# Patient Record
Sex: FEMALE | Race: BLACK OR AFRICAN AMERICAN | Employment: FULL TIME | URBAN - NONMETROPOLITAN AREA
[De-identification: names, ages, dates, MRNs, and addresses within clinical notes are randomized per-mention and may not be internally consistent; named-entity substitution may affect disease eponyms.]

---

## 2021-08-23 ENCOUNTER — HOSPITAL ENCOUNTER (OUTPATIENT)
Dept: PHYSICAL THERAPY | Age: 56
Discharge: HOME | End: 2021-08-23
Payer: COMMERCIAL

## 2021-08-23 PROCEDURE — 97161 PT EVAL LOW COMPLEX 20 MIN: CPT

## 2021-08-23 NOTE — PROGRESS NOTES
PT INITIAL EVALUATION NOTE 2-15    Patient Name: Kristy Beal  Date:2021  : 1965  [x]  Patient  Verified  Payor: BLUE CROSS / Plan: [address] [place] [address] / Product Type: PPO /    In time: 8:20 AM  Out time:9:00 AM  Visit #: 1     Treatment Area: Cervicalgia [M54.2]    SUBJECTIVE    Pain Level (0-10 scale): 8  Any medication changes, allergies to medications, adverse drug reactions, diagnosis change, or new procedure performed?: [] No    [x] Yes (see summary sheet for update)    Subjective:  63year old black female who was referred to Physical Therapy for neck pain that progressively got worse since 2021. Chief complaint: throbbing pain of right shoulder, constant numbness/tingling sensation of the right UE and difficulty sleeping at night. Night pain is relieved by hanging arm over the bed. Patient stated that she works at a [employer] in [place], [place]  for the last 3 years. X-rays revealed arthritis of the neck     PLOF: Independent  Mechanism of Injury: none  Previous Treatment/Compliance: none  PMHx/Surgical Hx: none  Work Hx: 3 years at [employer] [address] [place] [address] [employer]    Living Situation:  with family  Pt Goals:  To be able to drive without numbness of the right UE    Barriers: none  Motivation: good  Substance use: none   Cognition: A & O x 4        OBJECTIVE/EXAMINATION    Posture: fair  Other Observations: Right scapula tightness with decreased scapula mobility  Gait and Functional Mobility: none  Palpation: tenderness and swelling of the right scapula and shoulder   Right Scapula protrusion  TUG Test :11 sec - Not a fall risk     Strength: Right:  30         Left: 40 (right hand dominant)    Range of Motion limited of Upper extremity                            Right:                Left    Flexion -            130                   WNL  Abduction          160 pain           WNL  ER                      60 pain            WNL  IR 50 pain            WNL                                                       Cervical AROM    Flexion               30      Extension              45          R  L  Side Bending   40  35    Rotation   40  45      UPPER QUARTER   MUSCLE STRENGTH  KEY          R     L  0 - No Contraction  C1, C2 Neck Flex 4/5  4/5  1 - Trace   C3 Side Flex  4/5  4/5  2 - Poor   C4 Sh Elev  5/5  5/5  3 - Fair    C5 Deltoid/Biceps 5/5  5/5  4 - Good   C6 Wrist Ext  5/5  5/5  5 - Normal   C7 Triceps  5/5  5/5      C8 Thumb Ext  5/5  5/5      T1 Hand Inst             5/5  5/5          Neurological: Reflexes / Sensations: intact    Special Tests: Cervical Distraction: negative  Cervical Compression: negative           Pain Level (0-10 scale) post treatment: 8    ASSESSMENT:      [x]  See Plan of [facility], PT 8/23/2021

## 2021-08-23 NOTE — PROGRESS NOTES
[address]  [place], [place]  Ph: [phone]    Fax: [phone]    Plan of Care/Statement of Necessity for Physical Therapy Services  2-15      Patient name: Rafi Vicente  : 1965  Provider#: [id number]  Referral source: Tulio Rubalcava MD      Medical/Treatment Diagnosis: Cervicalgia [M54.2]     Prior Hospitalization: see medical history     Comorbidities: none  Prior Level of Function:Indepedent  Medications: Verified on Patient Summary List    Start of Care:2021      Onset Date: 2021       The Plan of Care and following information is based on the information from the initial evaluation. Assessment/ key information: 63year old black female who was referred to Physical Therapy for neck pain with right shoulder involvement. Patient presents symptoms of right shoulder impingement with with limited range of motion, decrease strength  and decreased cervical range of motion due to pain. Patient presents difficulty sleeping and numbness/tingling sensation of the right upper extremity. Inflammation of the right scapula with tenderness and trigger points. Patient will benefit from Physical Therapy to decrease pain, decrease inflammation, increase range of motion and return to  functional activities. Physical Therapy interventions will include therapeutic exercises, manual therapy, modalities and traction as needed. Patient will be instructed in a home exercise program with 1:1 supervision.           Evaluation Complexity History LOW Complexity : Zero comorbidities / personal factors that will impact the outcome / POC; Examination LOW Complexity : 1-2 Standardized tests and measures addressing body structure, function, activity limitation and / or participation in recreation  ;Presentation LOW Complexity : Stable, uncomplicated  ;Clinical Decision Making (TUG Test 11 sec)  Overall Complexity Rating: LOW     Problem List: pain affecting function, decrease ROM and decrease strength   Treatment Plan may include any combination of the following: Therapeutic exercise, Physical modalities, manual therapy, therapeutic activities, traction, Patient education  Patient / Family readiness to learn indicated by: asking questions  Persons(s) to be included in education: patient (P)  Barriers to Learning/Limitations: None  Patient Goal (s): To be able to sleep at night without shoulder pain  Patient Self Reported Health Status: good  Rehabilitation Potential: fair    Short Term Goals: To be accomplished in 6 treatments:  Patient will be able to drive vehicle for at least an 30 minutes with decrease numbness of the right UE at least 3 times a week. Patient will be able to sleep in the be for at least 4 hours a night with a pain level of 4/10 at least 3 times a week. Long Term Goals: To be accomplished in 12 treatments:  Patient will be able to perform an Apley Scratch Test with increased ER and IR of 70 degrees. Patient will be able to brush her hair into a pony tail with full flexion of the right UE of 180 degrees. Patient will be able to drive vehicle for at least an 45 minutes with decrease numbness of the right UE at least 3 times a week. Patient will be able to sleep in the be for at least 4 hours a night with a pain level of 4/10 at least 3 times a week. Frequency / Duration: Patient to be seen 2  times per week for 12  treatments. Patient/ Caregiver education and instruction: exercises    [x]  Plan of care has been reviewed with JASON Wagoner, PT 8/23/2021     ________________________________________________________________________    I certify that the above Therapy Services are being furnished while the patient is under my care. I agree with the treatment plan and certify that this therapy is necessary.     Physician's Signature:____________________  Date:____________Time: _________      Jodie Perez MD

## 2021-08-25 ENCOUNTER — HOSPITAL ENCOUNTER (OUTPATIENT)
Dept: PHYSICAL THERAPY | Age: 56
Discharge: HOME | End: 2021-08-25
Payer: COMMERCIAL

## 2021-08-25 PROCEDURE — 97110 THERAPEUTIC EXERCISES: CPT

## 2021-08-25 PROCEDURE — 97014 ELECTRIC STIMULATION THERAPY: CPT

## 2021-08-25 NOTE — PROGRESS NOTES
PT DAILY TREATMENT NOTE 2-15    Patient Name: Yvonnie Hamman  Date:2021  : 1965  [x]  Patient  Verified  Payor: BLUE CROSS / Plan: [address] [place] [address] / Product Type: PPO /    In time:803  Out time:0900  Total Treatment Time (min): 62  Visit #:  2    Treatment Area: Cervicalgia [M54.2]    SUBJECTIVE  Pain Level (0-10 scale): 5/10  Any medication changes, allergies to medications, adverse drug reactions, diagnosis change, or new procedure performed?: [x] No    [] Yes (see summary sheet for update)  Subjective functional status/changes:   [] No changes reported  Pt notes that her pain is mostly her arm and shoulder and is super tender to the touch. Pt notes that she has been out of work since last Friday due to her shoulder pain and goes back on Monday.     OBJECTIVE      Modality rationale: decrease inflammation, decrease pain and increase tissue extensibility to improve the patients ability to sleep throughout the night without an exacerbation of symptoms   Min Type Additional Details     15   [x] Estim: []Att   []Unatt    []TENS instruct                  []IFC  []Premod   []NMES                    []Other:  []w/US      [x]w/ heat  []w/ ice  Position: seated  Location: cervical/upper trap       []  Traction: [] Cervical       []Lumbar                       [] Prone          []Supine                       []Intermittent   []Continuous Lbs:  [] before manual  [] after manual  [] w/ heat  [] Simultaneously performed with w/ Estim    []  Ultrasound: []Continuous   [] Pulsed                       at: []1MHz   []3MHz Location:  W/cm2:    [] Paraffin         Location:   []w/heat    []  Ice     []  Heat  []  Ice massage Position:  Location:    []  Laser  []  Other: Position:  Location:      []  Vasopneumatic Device Pressure:       [] lo [] med [] hi   [] w/ ice      Temperature:   [] Simultaneously performed with w/ Estim     [x] Skin assessment post-treatment:  [x]intact [x]redness- no adverse reaction    []redness - adverse reaction:       42 min Therapeutic Exercise:  [x] See flow sheet :   Rationale: increase ROM and increase strength to improve the patients ability to perform workplace responsibilities with ease. With   [x] TE   [] TA   [] neuro   [] other: Patient Education: [x] Review HEP    [] Progressed/Changed HEP based on:   [] positioning   [] body mechanics   [] transfers   [] heat/ice application    [] other:        Pain Level (0-10 scale) post treatment: 5/10    ASSESSMENT/Changes in Function:   Pt presents to PT for first time follow up from initial evaluation and demonstrates increased R upper trap and levator scapulae tightness and trigger points. Pt completed therapeutic exercise as indicated on the flow sheet with focus of skilled PT session on improving cervical and GH ROM and postural strengthening with added emphasis on decreasing muscular tension of cervical musculature. Pt required frequent verbal cuing for decreasing compensatory strategy of upper trap elevation with performance of exercises. Patient tolerated treatment fairly well as noted by no increase in symptoms upon completion of the session. Provided pt with HEP and pt verbalized understanding of the importance of performing exercises at home in order to allow for adequate carryover between PT sessions. Patient will continue to benefit from skilled PT services to modify and progress therapeutic interventions, address functional mobility deficits, address ROM deficits, address strength deficits, analyze and address soft tissue restrictions, analyze and cue movement patterns and assess and modify postural abnormalities to attain remaining goals. [x]  See Plan of Care  []  See progress note/recertification  []  See Discharge Summary         Progress towards goals / Updated goals:  Short Term Goals:  To be accomplished in 6 treatments:  Patient will be able to drive vehicle for at least an 30 minutes with decrease numbness of the right UE at least 3 times a week. Progressing  Patient will be able to sleep in the be for at least 4 hours a night with a pain level of 4/10 at least 3 times a week. Progressing                 Long Term Goals: To be accomplished in 12 treatments:  Patient will be able to perform an Apley Scratch Test with increased ER and IR of 70 degrees. Patient will be able to brush her hair into a pony tail with full flexion of the right UE of 180 degrees. Patient will be able to drive vehicle for at least an 45 minutes with decrease numbness of the right UE at least 3 times a week. Patient will be able to sleep in the be for at least 4 hours a night with a pain level of 4/10 at least 3 times a week.                      PLAN  [x]  Upgrade activities as tolerated     [x]  Continue plan of care  []  Update interventions per flow sheet       []  Discharge due to:_  []  Other:_      Srinivas Wilson, SPT 8/25/2021

## 2021-08-31 ENCOUNTER — HOSPITAL ENCOUNTER (OUTPATIENT)
Dept: PHYSICAL THERAPY | Age: 56
Discharge: HOME | End: 2021-08-31
Payer: COMMERCIAL

## 2021-08-31 PROCEDURE — 97110 THERAPEUTIC EXERCISES: CPT

## 2021-08-31 PROCEDURE — 97014 ELECTRIC STIMULATION THERAPY: CPT

## 2021-08-31 NOTE — PROGRESS NOTES
PT DAILY TREATMENT NOTE 2-15    Patient Name: Ervin Luna  Date:2021  : 1965  [x]  Patient  Verified  Payor: Nealpaola Arora / Plan: [address] [place] [address] / Product Type: PPO /    In time: 3:26  Out time: 4:30  Total Treatment Time (min): 64  Visit #: 3    Treatment Area: Cervicalgia [M54.2]    SUBJECTIVE  Pain Level (0-10 scale): 0/10  Any medication changes, allergies to medications, adverse drug reactions, diagnosis change, or new procedure performed?: [x] No    [] Yes (see summary sheet for update)  Subjective functional status/changes:   [x] No changes reported    OBJECTIVE  Modality rationale: decrease pain and increase tissue extensibility to improve the patients ability to perform ADLs. Min Type Additional Details      15 [] Estim: []Att   [x]Unatt    []TENS instruct                  [x]IFC  []Premod   []NMES                    []Other:  []w/US      [x]w/ heat  []w/ ice  Position: Seated  Location: Over R cervical region       []  Traction: [] Cervical       []Lumbar                       [] Prone          []Supine                       []Intermittent   []Continuous Lbs:  [] before manual  [] after manual  [] w/ heat  [] Simultaneously performed with w/ Estim    []  Ultrasound: []Continuous   [] Pulsed                       at: []1MHz   []3MHz Location:  W/cm2:    [] Paraffin         Location:   []w/heat   15 []  Ice     [x]  Heat  []  Ice massage Position: Seated  Location: Over cervical region    []  Laser  []  Other: Position:  Location:      []  Vasopneumatic Device Pressure:       [] lo [] med [] hi   [] w/ ice      Temperature:   [] Simultaneously performed with w/ Estim     [x] Skin assessment post-treatment:  [x]intact [x]redness- no adverse reaction    []redness - adverse reaction:       49 min Therapeutic Exercise:  [x] See flow sheet :   Rationale: increase ROM and increase strength to improve the patients ability to perform ADLs.           With   [x] TE   [] TA   [] neuro   [] other: Patient Education: [x] Review HEP    [] Progressed/Changed HEP based on:   [] positioning   [] body mechanics   [] transfers   [] heat/ice application    [] other:      Other Objective/Functional Measures: Increased repetitions with chin tucks and cervical rotation and extension with towel to facilitate further improvements with cervical AROM. Pain Level (0-10 scale) post treatment: 0/10    ASSESSMENT/Changes in Function:   Patient tolerated treatment well. Pt is steadily progressing with improving cervical AROM as noted by progression of exercises. Pt requires verbal cueing for proper performance of cervical rotation with towel to prevent overstretching. Continue to progress as able. Patient will continue to benefit from skilled PT services to modify and progress therapeutic interventions, address functional mobility deficits, address ROM deficits, address strength deficits, analyze and address soft tissue restrictions, analyze and cue movement patterns, analyze and modify body mechanics/ergonomics and assess and modify postural abnormalities to attain remaining goals. [x]  See Plan of Care  []  See progress note/recertification  []  See Discharge Summary         Progress towards goals / Updated goals:  Short Term Goals: To be accomplished in 6 treatments:  Patient will be able to drive vehicle for at least an 30 minutes with decrease numbness of the right UE at least 3 times a week. Progressing  Patient will be able to sleep in the be for at least 4 hours a night with a pain level of 4/10 at least 3 times a week. Progressing                 Long Term Goals: To be accomplished in 12 treatments:  Patient will be able to perform an Apley Scratch Test with increased ER and IR of 70 degrees. Patient will be able to brush her hair into a pony tail with full flexion of the right UE of 180 degrees.   Patient will be able to drive vehicle for at least an 45 minutes with decrease numbness of the right UE at least 3 times a week. Patient will be able to sleep in the be for at least 4 hours a night with a pain level of 4/10 at least 3 times a week.     PLAN  [x]  Upgrade activities as tolerated     [x]  Continue plan of care  []  Update interventions per flow sheet       []  Discharge due to:_  []  Other:_      Richi Pendleton, PT, DPT 8/31/2021

## 2021-09-02 ENCOUNTER — HOSPITAL ENCOUNTER (OUTPATIENT)
Dept: PHYSICAL THERAPY | Age: 56
Discharge: HOME | End: 2021-09-02
Payer: COMMERCIAL

## 2021-09-02 PROCEDURE — 97016 VASOPNEUMATIC DEVICE THERAPY: CPT

## 2021-09-02 PROCEDURE — 97014 ELECTRIC STIMULATION THERAPY: CPT

## 2021-09-02 PROCEDURE — 97110 THERAPEUTIC EXERCISES: CPT

## 2021-09-02 NOTE — PROGRESS NOTES
PT DAILY TREATMENT NOTE 2-15    Patient Name: Neha Luke  Date:2021  : 1965  [x]  Patient  Verified  Payor: BLUE CROSS / Plan: [address] [place] [address] / Product Type: PPO /    In time:3:30 PM  Out time:4:25 PM  Total Treatment Time (min): 55  Visit #: 4    Treatment Area: Cervicalgia [M54.2]    SUBJECTIVE  Pain Level (0-10 scale): 0  Any medication changes, allergies to medications, adverse drug reactions, diagnosis change, or new procedure performed?: [x] No    [] Yes (see summary sheet for update)  Subjective functional status/changes:   [x] No changes reported      OBJECTIVE    Modality rationale: decrease inflammation, decrease pain and increase tissue extensibility to improve the patients ability to   drive vehicle for at least an 30 minutes with decrease numbness of the right UE at least 3 times a week   Min Type Additional Details      15 [x] Estim: []Att   [x]Unatt    []TENS instruct                  [x]IFC  []Premod   []NMES                     []Other:  []w/US   []w/ice   [x]w/heat  Position:right side of neck and shoulder in sitting       []  Traction: [] Cervical       []Lumbar                       [] Prone          []Supine                       []Intermittent   []Continuous Lbs:  [] before manual  [] after manual  []w/heat    []  Ultrasound: []Continuous   [] Pulsed                       at: []1MHz   []3MHz Location:  W/cm2:    [] Paraffin         Location:   []w/heat    []  Ice     []  Heat  []  Ice massage Position:  Location:    []  Laser  []  Other: Position:  Location:      []  Vasopneumatic Device Pressure:       [] lo [] med [] hi   Temperature:      [x] Skin assessment post-treatment:  [x]intact []redness- no adverse reaction    []redness - adverse reaction:         40 min Therapeutic Exercise:  [x] See flow sheet :   Rationale: increase ROM and increase strength to improve the patients ability to able to drive vehicle for at least an   30 minutes with decrease numbness of the right UE at least 3 times a week. With   [] TE   [] TA   [] Neuro   [] SC   [] other: Patient Education: [x] Review HEP    [] Progressed/Changed HEP based on:   [] positioning   [] body mechanics   [] transfers   [] heat/ice application    [] other:           Pain Level (0-10 scale) post treatment: 0    ASSESSMENT/Changes in Function:   Patient has inflammation of the  right shoulder. Pain with rotation movement. Best results from the use of modalities at this time. Continue with  Stretching exercises. May try ICE due to inflammation and trigger points. Patient will continue to benefit from skilled PT services to address ROM deficits and address strength deficits to attain remaining goals. [x]  See Plan of Care  []  See progress note/recertification  []  See Discharge Summary         Progress towards goals / Updated goals:  Short Term Goals: To be accomplished in 6 treatments:  Patient will be able to drive vehicle for at least an 30 minutes with decrease numbness of the right UE at least 3 times a week. Progressing  Patient will be able to sleep in the be for at least 4 hours a night with a pain level of 4/10 at least 3 times a week. Progressing                 Long Term Goals: To be accomplished in 12 treatments:  Patient will be able to perform an Apley Scratch Test with increased ER and IR of 70 degrees. Patient will be able to brush her hair into a pony tail with full flexion of the right UE of 180 degrees. Patient will be able to drive vehicle for at least an 45 minutes with decrease numbness of the right UE at least 3 times a week. Patient will be able to sleep in the be for at least 4 hours a night with a pain level of 4/10 at least 3 times a week. Progress towards goals / Updated goals:      PLAN  []  Upgrade activities as tolerated     [x]  Continue plan of care  []  Update interventions per flow sheet       []  Discharge due to:_  []  Other:_      Nay Ricardo PT 9/2/2021

## 2021-09-07 ENCOUNTER — HOSPITAL ENCOUNTER (OUTPATIENT)
Dept: PHYSICAL THERAPY | Age: 56
Discharge: HOME | End: 2021-09-07
Payer: COMMERCIAL

## 2021-09-07 PROCEDURE — 97110 THERAPEUTIC EXERCISES: CPT

## 2021-09-07 PROCEDURE — 97014 ELECTRIC STIMULATION THERAPY: CPT

## 2021-09-07 NOTE — PROGRESS NOTES
PT DAILY TREATMENT NOTE 2-15    Patient Name: Vern Lopez  Date:2021  : 1965  [x]  Patient  Verified  Payor: BLUE CROSS / Plan: [address] [place] [address] / Product Type: PPO /    In time:3:50 PM  Out time: 4:20  Total Treatment Time (min): 40  Visit #:  5    Treatment Area: Cervicalgia [M54.2]    SUBJECTIVE  Pain Level (0-10 scale): 3  Any medication changes, allergies to medications, adverse drug reactions, diagnosis change, or new procedure performed?: [x] No    [] Yes (see summary sheet for update)  Subjective functional status/changes:   [] No changes reported    Requested to leave early    OBJECTIVE    Modality rationale: decrease edema and decrease pain to improve the patients ability to  sleep at least 4 hours a night with a pain level of 4/10 at least 3 times a week.    Min Type Additional Details      15 [x] Estim: []Att   [x]Unatt    []TENS instruct                  [x]IFC  []Premod   []NMES                     []Other:  []w/US   []w/ice   [x]w/heat  Position:left shoulder   Location:sitting position       []  Traction: [] Cervical       []Lumbar                       [] Prone          []Supine                       []Intermittent   []Continuous Lbs:  [] before manual  [] after manual  []w/heat    []  Ultrasound: []Continuous   [] Pulsed                       at: []1MHz   []3MHz Location:  W/cm2:    [] Paraffin         Location:   []w/heat    []  Ice     []  Heat  []  Ice massage Position:  Location:    []  Laser  []  Other: Position:  Location:      []  Vasopneumatic Device Pressure:       [] lo [] med [] hi   Temperature:      [x] Skin assessment post-treatment:  [x]intact []redness- no adverse reaction    []redness - adverse reaction:         25 min Therapeutic Exercise:  [x] See flow sheet :   Rationale: increase ROM and increase strength to improve the patients ability to sleep in the be for at least 4 hours a night with   a pain level of 4/10 at least 3 times a week.       With   [] TE   [] TA   [] Neuro   [] SC   [] other: Patient Education: [x] Review HEP    [] Progressed/Changed HEP based on:   [] positioning   [] body mechanics   [] transfers   [] heat/ice application    [] other:        Pain Level (0-10 scale) post treatment: 2    ASSESSMENT/Changes in Function:   Patient is experiencing pain of the right side of neck and shoulder. Muscle inflammation with trigger points of the scapula. Patient will continue to benefit from skilled PT services to address ROM deficits, address strength deficits and analyze and address soft tissue restrictions to attain remaining goals. [x]  See Plan of Care  []  See progress note/recertification  []  See Discharge Summary           Progress towards goals / Updated goals:  Short Term Goals: To be accomplished in 6 treatments:  Patient will be able to drive vehicle for at least an 30 minutes with decrease numbness of the right UE at least 3 times a week. Progressing  Patient will be able to sleep in the be for at least 4 hours a night with a pain level of 4/10 at least 3 times a week. Progressing                 Long Term Goals: To be accomplished in 12 treatments:  Patient will be able to perform an Apley Scratch Test with increased ER and IR of 70 degrees. Patient will be able to brush her hair into a pony tail with full flexion of the right UE of 180 degrees. Patient will be able to drive vehicle for at least an 45 minutes with decrease numbness of the right UE at least 3 times a week. Patient will be able to sleep in the be for at least 4 hours a night with a pain level of 4/10 at least 3 times a week. rogress towards goals / Updated goals:       PLAN  []  Upgrade activities as tolerated     [x]  Continue plan of care  []  Update interventions per flow sheet       []  Discharge due to:_  []  Other:_      Karla Wagoner, PT 9/7/2021

## 2021-09-09 ENCOUNTER — HOSPITAL ENCOUNTER (OUTPATIENT)
Dept: PHYSICAL THERAPY | Age: 56
Discharge: HOME | End: 2021-09-09
Payer: COMMERCIAL

## 2021-09-09 PROCEDURE — 97014 ELECTRIC STIMULATION THERAPY: CPT

## 2021-09-09 PROCEDURE — 97110 THERAPEUTIC EXERCISES: CPT

## 2021-09-09 NOTE — PROGRESS NOTES
PT DAILY TREATMENT NOTE 2-15    Patient Name: Bertha Part  Date:2021  : 1965  [x]  Patient  Verified  Payor: BLUE CROSS / Plan: [address] [place] [address] / Product Type: PPO /    In time:   Out time: 9654  Total Treatment Time (min): 61  Visit #:  6    Treatment Area: Cervicalgia [M54.2]    SUBJECTIVE  Pain Level (0-10 scale): 3  Any medication changes, allergies to medications, adverse drug reactions, diagnosis change, or new procedure performed?: [x] No    [] Yes (see summary sheet for update)  Subjective functional status/changes:   [] No changes reported    \"It has been really hurting because of the rain. \"    OBJECTIVE    Modality rationale: decrease edema and decrease pain to improve the patients ability to  sleep at least 4 hours a night with a pain level of 4/10 at least 3 times a week.    Min Type Additional Details      15 [x] Estim: []Att   [x]Unatt    []TENS instruct                  [x]IFC  []Premod   []NMES                     []Other:  []w/US   []w/ice   [x]w/heat  Position:left shoulder   Location:sitting position       []  Traction: [] Cervical       []Lumbar                       [] Prone          []Supine                       []Intermittent   []Continuous Lbs:  [] before manual  [] after manual  []w/heat    []  Ultrasound: []Continuous   [] Pulsed                       at: []1MHz   []3MHz Location:  W/cm2:    [] Paraffin         Location:   []w/heat    []  Ice     []  Heat  []  Ice massage Position:  Location:    []  Laser  []  Other: Position:  Location:      []  Vasopneumatic Device Pressure:       [] lo [] med [] hi   Temperature:      [x] Skin assessment post-treatment:  [x]intact []redness- no adverse reaction    []redness - adverse reaction:         44 min Therapeutic Exercise:  [x] See flow sheet :   Rationale: increase ROM and increase strength to improve the patients ability to sleep in the be for at least 4 hours a night with   a pain level of 4/10 at least 3 times a week.       With   [x] TE   [] TA   [] Neuro   [] SC   [] other: Patient Education: [x] Review HEP    [] Progressed/Changed HEP based on:   [] positioning   [] body mechanics   [] transfers   [] heat/ice application    [] other:        Pain Level (0-10 scale) post treatment: 0/10    ASSESSMENT/Changes in Function:   Pt tolerated treatment well today. Able to tolerate exercises and needed minimal cues for correct posture. Pain continues to be along R UT due to myofacial tightness. Patient will continue to benefit from skilled PT services to address ROM deficits, address strength deficits and analyze and address soft tissue restrictions to attain remaining goals. [x]  See Plan of Care  []  See progress note/recertification  []  See Discharge Summary           Progress towards goals / Updated goals:  Short Term Goals: To be accomplished in 6 treatments:  Patient will be able to drive vehicle for at least an 30 minutes with decrease numbness of the right UE at least 3 times a week. Progressing  Patient will be able to sleep in the be for at least 4 hours a night with a pain level of 4/10 at least 3 times a week. Progressing                 Long Term Goals: To be accomplished in 12 treatments:  Patient will be able to perform an Apley Scratch Test with increased ER and IR of 70 degrees. Patient will be able to brush her hair into a pony tail with full flexion of the right UE of 180 degrees. Patient will be able to drive vehicle for at least an 45 minutes with decrease numbness of the right UE at least 3 times a week. Patient will be able to sleep in the be for at least 4 hours a night with a pain level of 4/10 at least 3 times a week. rogress towards goals / Updated goals:       PLAN  [x]  Upgrade activities as tolerated     [x]  Continue plan of care  []  Update interventions per flow sheet       []  Discharge due to:_  []  Other:_      Johnny Carranza, PT, DPT 9/9/2021

## 2022-01-04 NOTE — PROGRESS NOTES
[address]  [place], [place]  Ph: [phone]    Fax: [phone]    Discharge Summary  2-15    Patient name: Izzy Hernandez  : 1965  Provider#: [id number]  Referral source: Janene Baird MD      Medical/Treatment Diagnosis: Cervicalgia [M54.2]     Prior Hospitalization: see medical history     Comorbidities: See Plan of Care  Prior Level of Function:See Plan of Care  Medications: Verified on Patient Summary List    Start of Care: 2021      Onset Date: 2021   Visits from Start of Care: 6 Missed Visits: 6  Reporting Period : 21 to 21      ASSESSMENT/SUMMARY OF CARE:     63year old black female who was referred to Physical Therapy for neck pain with right shoulder involvement. Patient presented symptoms of right shoulder impingement with with limited range of motion, decrease strength  and decreased cervical range of motion due to pain. Patient has returned to her prior level of function. Patient was at a pain level of 0/10. Patient has benefit from Physical Therapy to decrease pain, decrease inflammation, increase range of motion and return to  functional activities. Physical Therapy interventions will include therapeutic exercises, manual therapy, modalities and traction as needed. Patient was instructed in a home exercise program with 1:1 supervision. Progress towards goals / Updated goals:  Short Term Goals: To be accomplished in 6 treatments:  Patient will be able to drive vehicle for at least an 30 minutes with decrease numbness of the right UE at least 3 times a week. Progressing  Patient will be able to sleep in the be for at least 4 hours a night with a pain level of 4/10 at least 3 times a week. Progressing                 Long Term Goals: To be accomplished in 12 treatments:  Patient will be able to perform an Apley Scratch Test with increased ER and IR of 70 degrees.  Not Met  Patient will be able to brush her hair into a pony tail with full flexion of the right UE of 180 degrees. Not Met  Patient will be able to drive vehicle for at least an 45 minutes with decrease numbness of the right UE at least 3 times a week. Not Met  Patient will be able to sleep in the be for at least 4 hours a night with a pain level of 4/10 at least 3 times a week.  Not Met      RECOMMENDATIONS:  [x]Discontinue therapy: []Patient has reached or is progressing toward set goals      []Patient is non-compliant or has abdicated      []Due to lack of appreciable progress towards set goals      []Other      Anne Arce, PT  1/4/2022

## 2022-06-23 VITALS — WEIGHT: 148 LBS | BODY MASS INDEX: 29.06 KG/M2 | HEIGHT: 60 IN

## 2022-06-23 PROBLEM — K62.5 HEMORRHAGE OF RECTUM AND ANUS: Status: ACTIVE | Noted: 2022-06-17

## 2022-06-23 PROBLEM — R19.7 DIARRHEA: Status: ACTIVE | Noted: 2022-06-23

## 2022-06-23 PROBLEM — K62.5 RECTAL BLEEDING: Status: ACTIVE | Noted: 2022-06-23

## 2022-06-23 PROBLEM — R11.0 NAUSEA: Status: ACTIVE | Noted: 2022-06-23

## 2022-06-23 RX ORDER — MELOXICAM 7.5 MG/1
TABLET ORAL
COMMUNITY
Start: 2022-06-13 | End: 2022-10-24

## 2022-06-23 RX ORDER — HYDROCORTISONE 25 MG/G
CREAM TOPICAL
COMMUNITY
Start: 2022-06-17 | End: 2022-10-24

## 2022-06-23 RX ORDER — ONDANSETRON 4 MG/1
TABLET, ORALLY DISINTEGRATING ORAL
COMMUNITY
Start: 2022-06-13 | End: 2022-10-24

## 2022-07-01 PROBLEM — Z87.898 HX OF FEVER: Status: ACTIVE | Noted: 2022-06-13

## 2022-07-01 PROBLEM — R63.0 DECREASED APPETITE: Status: ACTIVE | Noted: 2022-07-01

## 2022-07-05 ENCOUNTER — OFFICE VISIT (OUTPATIENT)
Dept: GASTROENTEROLOGY | Age: 57
End: 2022-07-05
Payer: COMMERCIAL

## 2022-07-05 ENCOUNTER — TELEPHONE (OUTPATIENT)
Dept: GASTROENTEROLOGY | Age: 57
End: 2022-07-05

## 2022-07-05 VITALS
WEIGHT: 144 LBS | BODY MASS INDEX: 28.27 KG/M2 | DIASTOLIC BLOOD PRESSURE: 84 MMHG | RESPIRATION RATE: 14 BRPM | OXYGEN SATURATION: 98 % | HEART RATE: 97 BPM | TEMPERATURE: 98.1 F | HEIGHT: 60 IN | SYSTOLIC BLOOD PRESSURE: 140 MMHG

## 2022-07-05 DIAGNOSIS — K62.5 RECTAL BLEEDING: Primary | ICD-10-CM

## 2022-07-05 DIAGNOSIS — K62.5 GASTROINTESTINAL HEMORRHAGE ASSOCIATED WITH ANORECTAL SOURCE: ICD-10-CM

## 2022-07-05 DIAGNOSIS — K59.00 CONSTIPATION, UNSPECIFIED CONSTIPATION TYPE: ICD-10-CM

## 2022-07-05 DIAGNOSIS — R10.30 LOWER ABDOMINAL PAIN: ICD-10-CM

## 2022-07-05 PROCEDURE — 99204 OFFICE O/P NEW MOD 45 MIN: CPT | Performed by: INTERNAL MEDICINE

## 2022-07-05 RX ORDER — POLYETHYLENE GLYCOL 3350 17 G/17G
POWDER, FOR SOLUTION ORAL
Qty: 510 G | Refills: 0 | Status: SHIPPED | OUTPATIENT
Start: 2022-07-05 | End: 2022-07-14

## 2022-07-05 RX ORDER — LACTULOSE 10 G/15ML
20 SOLUTION ORAL; RECTAL
Qty: 946 ML | Refills: 5 | Status: SHIPPED | OUTPATIENT
Start: 2022-07-05

## 2022-07-05 NOTE — PROGRESS NOTES
1. Have you been to the ER, urgent care clinic since your last visit? Hospitalized since your last visit? no    2. Have you seen or consulted any other health care providers outside of the [address] [place] [address] since your last visit? Include any pap smears or colon screening.   No   Chief Complaint   Patient presents with    New Patient    Rectal Bleeding     Visit Vitals  BP (!) 140/84 (BP 1 Location: Right upper arm, BP Patient Position: Sitting, BP Cuff Size: Adult)   Pulse 97   Temp 98.1 °F (36.7 °C) (Temporal)   Resp 14   Ht 5' (1.524 m)   Wt 65.3 kg (144 lb)   SpO2 98% Comment: room air   BMI 28.12 kg/m²

## 2022-07-05 NOTE — PROGRESS NOTES
COLONOSCOPY IS SCHEDULED FOR 7-  NO PRIOR AUTH REQUIRED PER [address] [place] [address]-7-6-2022.  REF # [id number]. 11:11 AM

## 2022-07-05 NOTE — PROGRESS NOTES
Scott King is a 61 y.o. female who presents today for the following:  Chief Complaint   Patient presents with    New Patient    Rectal Bleeding     last episode one week ago         No Known Allergies    Current Outpatient Medications   Medication Sig    polyethylene glycol (MIRALAX) 17 gram/dose powder Use as directed  Indications: emptying of the bowel    lactulose (CHRONULAC) 10 gram/15 mL solution Take 30 mL by mouth daily (after dinner). Indications: constipation    Procto-Med HC 2.5 % rectal cream     ondansetron (ZOFRAN ODT) 4 mg disintegrating tablet  (Patient not taking: Reported on 2022)    meloxicam (MOBIC) 7.5 mg tablet  (Patient not taking: Reported on 2022)     No current facility-administered medications for this visit. Past Medical History:   Diagnosis Date    Abdominal pain     lower    Anemia 2022    HGB 10.7      DR KEMP'S OFFICE.     Constipation     Decreased appetite 2022    Diarrhea 2022    H/O hemorrhoids     Hemorrhage of rectum and anus 2022    Hx of fever 2022    HX OF FEVER AND CHILLS 22    Nausea 2022    Over weight     Rectal bleeding        Past Surgical History:   Procedure Laterality Date    HX  SECTION         Family History   Problem Relation Age of Onset    Diabetes Mother     Hypertension Mother     Heart Disease Father        Social History     Socioeconomic History    Marital status:      Spouse name: Not on file    Number of children: Not on file    Years of education: Not on file    Highest education level: Not on file   Occupational History    Not on file   Tobacco Use    Smoking status: Never Smoker    Smokeless tobacco: Never Used   Vaping Use    Vaping Use: Never used   Substance and Sexual Activity    Alcohol use: Never    Drug use: Never    Sexual activity: Not on file   Other Topics Concern    Not on file   Social History Narrative    Not on file     Social Determinants of Health     Financial Resource Strain:     Difficulty of Paying Living Expenses: Not on file   Food Insecurity:     Worried About Running Out of Food in the Last Year: Not on file    Arturo of Food in the Last Year: Not on file   Transportation Needs:     Lack of Transportation (Medical): Not on file    Lack of Transportation (Non-Medical): Not on file   Physical Activity:     Days of Exercise per Week: Not on file    Minutes of Exercise per Session: Not on file   Stress:     Feeling of Stress : Not on file   Social Connections:     Frequency of Communication with Friends and Family: Not on file    Frequency of Social Gatherings with Friends and Family: Not on file    Attends [employer] Services: Not on file    Active Member of [address] [place] [address] [employer] or Organizations: Not on file    Attends Club or Organization Meetings: Not on file    Marital Status: Not on file   Intimate Partner Violence:     Fear of Current or Ex-Partner: Not on file    Emotionally Abused: Not on file    Physically Abused: Not on file    Sexually Abused: Not on file   Housing Stability:     Unable to Pay for Housing in the Last Year: Not on file    Number of [place] in the Last Year: Not on file    Unstable Housing in the Last Year: Not on file         HPI  65-year-old female with history of chronic constipation hemorrhoids who comes in for evaluation of rectal bleeding. Patient states for approximately the last couple weeks she has been seen in blood in her stool on defecation. She states initially she passed a large amount and has been less since that time. The blood was in the commode as well as on wiping. Blood was bright red blood. She states she still seems a little blood intermittently. The blood is outside of the stool. She does have a history of hemorrhoids. She did have lower abdominal pain initially. She states that her bowel movements have been constipated a lot and she has to strain.   No prior colonoscopy. She is eating well and has good appetite. Stable weight. She denies use of NSAIDs or blood thinners. Review of Systems   Constitutional: Negative. HENT: Negative. Negative for nosebleeds. Eyes: Negative. Respiratory: Negative. Cardiovascular: Negative. Gastrointestinal: Positive for abdominal pain and constipation. Negative for blood in stool, diarrhea, heartburn, melena, nausea and vomiting. Genitourinary: Negative. Skin: Negative. Neurological: Negative. Endo/Heme/Allergies: Negative. Psychiatric/Behavioral: Negative. All other systems reviewed and are negative. Visit Vitals  BP (!) 140/84 (BP 1 Location: Right upper arm, BP Patient Position: Sitting, BP Cuff Size: Adult)   Pulse 97   Temp 98.1 °F (36.7 °C) (Temporal)   Resp 14   Ht 5' (1.524 m)   Wt 65.3 kg (144 lb)   SpO2 98% Comment: room air   BMI 28.12 kg/m²     Physical Exam  Vitals and nursing note reviewed. Constitutional:       Appearance: Normal appearance. She is obese. HENT:      Head: Normocephalic and atraumatic. Nose: Nose normal.      Mouth/Throat:      Mouth: Mucous membranes are moist.      Pharynx: Oropharynx is clear. Eyes:      General: No scleral icterus. Conjunctiva/sclera: Conjunctivae normal.      Pupils: Pupils are equal, round, and reactive to light. Cardiovascular:      Rate and Rhythm: Normal rate and regular rhythm. Pulses: Normal pulses. Heart sounds: Normal heart sounds. Pulmonary:      Effort: Pulmonary effort is normal.      Breath sounds: Normal breath sounds. Abdominal:      General: Bowel sounds are normal. There is no distension. Palpations: Abdomen is soft. There is no mass. Tenderness: There is abdominal tenderness. There is no right CVA tenderness, left CVA tenderness, guarding or rebound. Hernia: No hernia is present. Musculoskeletal:         General: Normal range of motion.       Cervical back: Normal range of motion and neck supple. Skin:     General: Skin is warm and dry. Coloration: Skin is not jaundiced. Neurological:      General: No focal deficit present. Mental Status: She is alert and oriented to person, place, and time. Psychiatric:         Mood and Affect: Mood normal.         Behavior: Behavior normal.         Thought Content: Thought content normal.         Judgment: Judgment normal.            1. Rectal bleeding  Patient for a colonoscopy to further evaluate the cause of this rectal bleeding. Must consider hemorrhoids versus fissure versus diverticular bleeding versus a polyp or neoplasm.   - COLONOSCOPY,DIAGNOSTIC; Future  - polyethylene glycol (MIRALAX) 17 gram/dose powder; Use as directed  Indications: emptying of the bowel  Dispense: 510 g; Refill: 0    2. Gastrointestinal hemorrhage associated with anorectal source      3. Lower abdominal pain      4. Constipation, unspecified constipation type  We will start patient on lactulose 30 mL every evening and adjust dose as needed. Patient advised to increase fluid intake. - lactulose (CHRONULAC) 10 gram/15 mL solution; Take 30 mL by mouth daily (after dinner).  Indications: constipation  Dispense: 946 mL; Refill: 5

## 2022-07-05 NOTE — LETTER
NOTIFICATION RETURN TO WORK / SCHOOL    7/5/2022 5:21 PM    [address]. [address] [facility] [address]      To Whom It May Concern:    Zeny Griffith is currently under the care of MONSERRAT Masterson, and was seen by me today. She will return to work/school on next scheduled work day. If there are questions or concerns please have the patient contact our office.         Sincerely,      Sin Mcgee MD

## 2022-07-05 NOTE — H&P (VIEW-ONLY)
Serafin Ca is a 61 y.o. female who presents today for the following:  Chief Complaint   Patient presents with    New Patient    Rectal Bleeding     last episode one week ago         No Known Allergies    Current Outpatient Medications   Medication Sig    polyethylene glycol (MIRALAX) 17 gram/dose powder Use as directed  Indications: emptying of the bowel    lactulose (CHRONULAC) 10 gram/15 mL solution Take 30 mL by mouth daily (after dinner). Indications: constipation    Procto-Med HC 2.5 % rectal cream     ondansetron (ZOFRAN ODT) 4 mg disintegrating tablet  (Patient not taking: Reported on 2022)    meloxicam (MOBIC) 7.5 mg tablet  (Patient not taking: Reported on 2022)     No current facility-administered medications for this visit. Past Medical History:   Diagnosis Date    Abdominal pain     lower    Anemia 2022    HGB 10.7      DR KEMP'S OFFICE.     Constipation     Decreased appetite 2022    Diarrhea 2022    H/O hemorrhoids     Hemorrhage of rectum and anus 2022    Hx of fever 2022    HX OF FEVER AND CHILLS 22    Nausea 2022    Over weight     Rectal bleeding        Past Surgical History:   Procedure Laterality Date    HX  SECTION         Family History   Problem Relation Age of Onset    Diabetes Mother     Hypertension Mother     Heart Disease Father        Social History     Socioeconomic History    Marital status:      Spouse name: Not on file    Number of children: Not on file    Years of education: Not on file    Highest education level: Not on file   Occupational History    Not on file   Tobacco Use    Smoking status: Never Smoker    Smokeless tobacco: Never Used   Vaping Use    Vaping Use: Never used   Substance and Sexual Activity    Alcohol use: Never    Drug use: Never    Sexual activity: Not on file   Other Topics Concern    Not on file   Social History Narrative    Not on file     Social Determinants of Health     Financial Resource Strain:     Difficulty of Paying Living Expenses: Not on file   Food Insecurity:     Worried About Running Out of Food in the Last Year: Not on file    Arturo of Food in the Last Year: Not on file   Transportation Needs:     Lack of Transportation (Medical): Not on file    Lack of Transportation (Non-Medical): Not on file   Physical Activity:     Days of Exercise per Week: Not on file    Minutes of Exercise per Session: Not on file   Stress:     Feeling of Stress : Not on file   Social Connections:     Frequency of Communication with Friends and Family: Not on file    Frequency of Social Gatherings with Friends and Family: Not on file    Attends [employer] Services: Not on file    Active Member of [address] [place] [address] [employer] or Organizations: Not on file    Attends Club or Organization Meetings: Not on file    Marital Status: Not on file   Intimate Partner Violence:     Fear of Current or Ex-Partner: Not on file    Emotionally Abused: Not on file    Physically Abused: Not on file    Sexually Abused: Not on file   Housing Stability:     Unable to Pay for Housing in the Last Year: Not on file    Number of [place] in the Last Year: Not on file    Unstable Housing in the Last Year: Not on file         HPI  79-year-old female with history of chronic constipation hemorrhoids who comes in for evaluation of rectal bleeding. Patient states for approximately the last couple weeks she has been seen in blood in her stool on defecation. She states initially she passed a large amount and has been less since that time. The blood was in the commode as well as on wiping. Blood was bright red blood. She states she still seems a little blood intermittently. The blood is outside of the stool. She does have a history of hemorrhoids. She did have lower abdominal pain initially. She states that her bowel movements have been constipated a lot and she has to strain.   No prior colonoscopy. She is eating well and has good appetite. Stable weight. She denies use of NSAIDs or blood thinners. Review of Systems   Constitutional: Negative. HENT: Negative. Negative for nosebleeds. Eyes: Negative. Respiratory: Negative. Cardiovascular: Negative. Gastrointestinal: Positive for abdominal pain and constipation. Negative for blood in stool, diarrhea, heartburn, melena, nausea and vomiting. Genitourinary: Negative. Skin: Negative. Neurological: Negative. Endo/Heme/Allergies: Negative. Psychiatric/Behavioral: Negative. All other systems reviewed and are negative. Visit Vitals  BP (!) 140/84 (BP 1 Location: Right upper arm, BP Patient Position: Sitting, BP Cuff Size: Adult)   Pulse 97   Temp 98.1 °F (36.7 °C) (Temporal)   Resp 14   Ht 5' (1.524 m)   Wt 65.3 kg (144 lb)   SpO2 98% Comment: room air   BMI 28.12 kg/m²     Physical Exam  Vitals and nursing note reviewed. Constitutional:       Appearance: Normal appearance. She is obese. HENT:      Head: Normocephalic and atraumatic. Nose: Nose normal.      Mouth/Throat:      Mouth: Mucous membranes are moist.      Pharynx: Oropharynx is clear. Eyes:      General: No scleral icterus. Conjunctiva/sclera: Conjunctivae normal.      Pupils: Pupils are equal, round, and reactive to light. Cardiovascular:      Rate and Rhythm: Normal rate and regular rhythm. Pulses: Normal pulses. Heart sounds: Normal heart sounds. Pulmonary:      Effort: Pulmonary effort is normal.      Breath sounds: Normal breath sounds. Abdominal:      General: Bowel sounds are normal. There is no distension. Palpations: Abdomen is soft. There is no mass. Tenderness: There is abdominal tenderness. There is no right CVA tenderness, left CVA tenderness, guarding or rebound. Hernia: No hernia is present. Musculoskeletal:         General: Normal range of motion.       Cervical back: Normal range of motion and neck supple. Skin:     General: Skin is warm and dry. Coloration: Skin is not jaundiced. Neurological:      General: No focal deficit present. Mental Status: She is alert and oriented to person, place, and time. Psychiatric:         Mood and Affect: Mood normal.         Behavior: Behavior normal.         Thought Content: Thought content normal.         Judgment: Judgment normal.            1. Rectal bleeding  Patient for a colonoscopy to further evaluate the cause of this rectal bleeding. Must consider hemorrhoids versus fissure versus diverticular bleeding versus a polyp or neoplasm.   - COLONOSCOPY,DIAGNOSTIC; Future  - polyethylene glycol (MIRALAX) 17 gram/dose powder; Use as directed  Indications: emptying of the bowel  Dispense: 510 g; Refill: 0    2. Gastrointestinal hemorrhage associated with anorectal source      3. Lower abdominal pain      4. Constipation, unspecified constipation type  We will start patient on lactulose 30 mL every evening and adjust dose as needed. Patient advised to increase fluid intake. - lactulose (CHRONULAC) 10 gram/15 mL solution; Take 30 mL by mouth daily (after dinner).  Indications: constipation  Dispense: 946 mL; Refill: 5

## 2022-07-14 ENCOUNTER — ANESTHESIA (OUTPATIENT)
Dept: ENDOSCOPY | Age: 57
End: 2022-07-14
Payer: COMMERCIAL

## 2022-07-14 ENCOUNTER — HOSPITAL ENCOUNTER (OUTPATIENT)
Age: 57
Setting detail: OUTPATIENT SURGERY
Discharge: HOME | End: 2022-07-14
Attending: INTERNAL MEDICINE | Admitting: INTERNAL MEDICINE
Payer: COMMERCIAL

## 2022-07-14 ENCOUNTER — ANESTHESIA EVENT (OUTPATIENT)
Dept: ENDOSCOPY | Age: 57
End: 2022-07-14
Payer: COMMERCIAL

## 2022-07-14 VITALS
SYSTOLIC BLOOD PRESSURE: 124 MMHG | RESPIRATION RATE: 18 BRPM | HEIGHT: 60 IN | TEMPERATURE: 97.8 F | WEIGHT: 141.2 LBS | BODY MASS INDEX: 27.72 KG/M2 | HEART RATE: 76 BPM | OXYGEN SATURATION: 100 % | DIASTOLIC BLOOD PRESSURE: 83 MMHG

## 2022-07-14 PROCEDURE — 74011250636 HC RX REV CODE- 250/636

## 2022-07-14 PROCEDURE — 77030019988 HC FCPS ENDOSC DISP BSC -B: Performed by: INTERNAL MEDICINE

## 2022-07-14 PROCEDURE — 88305 TISSUE EXAM BY PATHOLOGIST: CPT

## 2022-07-14 PROCEDURE — 76040000007: Performed by: INTERNAL MEDICINE

## 2022-07-14 PROCEDURE — 74011250636 HC RX REV CODE- 250/636: Performed by: NURSE ANESTHETIST, CERTIFIED REGISTERED

## 2022-07-14 PROCEDURE — 2709999900 HC NON-CHARGEABLE SUPPLY: Performed by: INTERNAL MEDICINE

## 2022-07-14 PROCEDURE — 74011250636 HC RX REV CODE- 250/636: Performed by: INTERNAL MEDICINE

## 2022-07-14 PROCEDURE — 76060000032 HC ANESTHESIA 0.5 TO 1 HR: Performed by: INTERNAL MEDICINE

## 2022-07-14 PROCEDURE — 45380 COLONOSCOPY AND BIOPSY: CPT | Performed by: INTERNAL MEDICINE

## 2022-07-14 RX ORDER — SODIUM CHLORIDE 9 MG/ML
150 INJECTION, SOLUTION INTRAVENOUS CONTINUOUS
Status: DISCONTINUED | OUTPATIENT
Start: 2022-07-14 | End: 2022-07-14 | Stop reason: HOSPADM

## 2022-07-14 RX ORDER — SODIUM CHLORIDE 0.9 % (FLUSH) 0.9 %
5-40 SYRINGE (ML) INJECTION AS NEEDED
Status: DISCONTINUED | OUTPATIENT
Start: 2022-07-14 | End: 2022-07-14 | Stop reason: HOSPADM

## 2022-07-14 RX ORDER — SODIUM CHLORIDE 9 MG/ML
INJECTION, SOLUTION INTRAVENOUS
Status: DISCONTINUED | OUTPATIENT
Start: 2022-07-14 | End: 2022-07-14 | Stop reason: HOSPADM

## 2022-07-14 RX ORDER — SODIUM CHLORIDE 9 MG/ML
125 INJECTION, SOLUTION INTRAVENOUS CONTINUOUS
Status: DISCONTINUED | OUTPATIENT
Start: 2022-07-14 | End: 2022-07-14 | Stop reason: HOSPADM

## 2022-07-14 RX ORDER — PROPOFOL 10 MG/ML
INJECTION, EMULSION INTRAVENOUS AS NEEDED
Status: DISCONTINUED | OUTPATIENT
Start: 2022-07-14 | End: 2022-07-14 | Stop reason: HOSPADM

## 2022-07-14 RX ORDER — SODIUM CHLORIDE 0.9 % (FLUSH) 0.9 %
5-40 SYRINGE (ML) INJECTION EVERY 8 HOURS
Status: DISCONTINUED | OUTPATIENT
Start: 2022-07-14 | End: 2022-07-14 | Stop reason: HOSPADM

## 2022-07-14 RX ADMIN — PROPOFOL 100 MG: 10 INJECTION, EMULSION INTRAVENOUS at 10:25

## 2022-07-14 RX ADMIN — PROPOFOL 50 MG: 10 INJECTION, EMULSION INTRAVENOUS at 10:51

## 2022-07-14 RX ADMIN — PROPOFOL 50 MG: 10 INJECTION, EMULSION INTRAVENOUS at 10:31

## 2022-07-14 RX ADMIN — PROPOFOL 50 MG: 10 INJECTION, EMULSION INTRAVENOUS at 10:45

## 2022-07-14 RX ADMIN — SODIUM CHLORIDE: 9 INJECTION, SOLUTION INTRAVENOUS at 10:13

## 2022-07-14 RX ADMIN — SODIUM CHLORIDE 150 ML/HR: 9 INJECTION, SOLUTION INTRAVENOUS at 08:53

## 2022-07-14 NOTE — INTERVAL H&P NOTE
Update History & Physical    The Patient's History and Physical of 7/14/2022,  was reviewed with the patient and I examined the patient. There was no change. The surgical site was confirmed by the patient and me. Plan:  The risk, benefits, expected outcome, and alternative to the recommended procedure have been discussed with the patient. Patient understands and wants to proceed with the procedure.     Electronically signed by Anthony Mancia MD on 7/14/2022 at 9:07 AM

## 2022-07-14 NOTE — DISCHARGE INSTRUCTIONS

## 2022-07-14 NOTE — ANESTHESIA POSTPROCEDURE EVALUATION
Procedure(s):  COLONOSCOPY (T I V A). total IV anesthesia    Anesthesia Post Evaluation      Multimodal analgesia: multimodal analgesia not used between 6 hours prior to anesthesia start to PACU discharge  Patient location during evaluation: PACU  Patient participation: complete - patient participated  Pain management: adequate  Airway patency: patent  Anesthetic complications: no  Cardiovascular status: acceptable and stable  Respiratory status: acceptable and room air  Hydration status: acceptable  Post anesthesia nausea and vomiting:  none  Final Post Anesthesia Temperature Assessment:  Normothermia (36.0-37.5 degrees C)      INITIAL Post-op Vital signs: No vitals data found for the desired time range.

## 2022-07-14 NOTE — ANESTHESIA PREPROCEDURE EVALUATION
Relevant Problems   No relevant active problems       Anesthetic History   No history of anesthetic complications  Other anesthesia complications          Review of Systems / Medical History  Patient summary reviewed, nursing notes reviewed and pertinent labs reviewed    Pulmonary  Within defined limits                 Neuro/Psych   Within defined limits           Cardiovascular  Within defined limits                     GI/Hepatic/Renal  Within defined limits              Endo/Other        Obesity  Pertinent negatives: No morbid obesity   Other Findings              Physical Exam    Airway  Mallampati: II  TM Distance: 4 - 6 cm  Neck ROM: normal range of motion        Cardiovascular    Rhythm: regular  Rate: normal         Dental  No notable dental hx       Pulmonary  Breath sounds clear to auscultation               Abdominal  Abdominal exam normal       Other Findings            Anesthetic Plan    ASA: 2  Anesthesia type: total IV anesthesia            Anesthetic plan and risks discussed with: Patient

## 2022-07-14 NOTE — OP NOTES
Colonoscopy Procedure Note      Patient: Eliazar Ricardo MRN: [id number]  SSN: [id number]    YOB: 1965  Age: 61 y.o. Sex: female      Date of Procedure: 7/14/2022  Date/Time:  7/14/2022 11:04 AM       IMPRESSION:     1. Focal colitis in right colon   2. Proctitis              3.  Anal stenosis         RECOMMENDATIONS:     1) Check biopsy results. 2) Await pathology report. Call me in 2 weeks if you have not received any information from my office regarding your results. 3) Repeat colonoscopy in 5 years or as determined by the pathology report. INDICATION: Rectal bleeding   PROCEDURE PERFORMED: Colonoscopy with cold biopsies     DESCRIPTION OF PROCEDURE: An informed consent was obtained. The patient was placed in left lateral position. Perianal inspection and a digital rectal exam was performed. Video colonoscope was introduced into the rectum and advanced under direct vision up to the terminal ileum. With adequate insufflation and maneuvering of the withdrawing scope, the colonic mucosa was visualized carefully. Retroflexion was performed in the rectum to see the anorectum and also in the ascending colon to look behind the folds. Vital signs, pulse oximetry, single lead cardiac monitor were monitored throughout the procedure as the sedation was titrated to the desired effect ensuring patient comfort and safety. The patient tolerated the procedure very well and was transferred to the recovery area. Following is the summary of findings: In the descending and cecum we saw a few small focal areas of mandibular ulceration scattered throughout the region with normal mucosa between. A few biopsies were taken of these lesions. In the rectum and the distal aspect without dysplastic appearing mucosa that was somewhat inflamed. Biopsies were taken in the distal rectum to the anal verge. No lesions were noted.   During the anus was noted to be somewhat tight suggestive of anal stenosis. ENDOSCOPIST: Kirsten Austin MD      ENDOSCOPE: Olympus videocolonoscope     ASSISTANT:Circ-1: [place]              Scrub Tech-1: Luis A High     ANESTHESIA: TIVA      QUALITY OF PREPARATION: Good      FINDINGS:   1. Focal colitis right colon  2. Proctitis  3.  Anal stenosis     Complications: None     EBL: Minimal     SPECIMENS:   ID Type Source Tests Collected by Time Destination   1 : Ascending Colon Mucosa  Preservative Colon  Wilmer Vela MD 7/14/2022 1049 Pathology   2 : Rectal Mucosa  Preservative Colon  Wilmer Vela MD 7/14/2022 1056 Pathology             Kirsten Austin MD  July 14, 2022  11:04 AM

## 2022-08-29 NOTE — PROGRESS NOTES
Tell patient that the biopsies taken and her colon showed colitis/inflammation. Patient will probably need a follow-up visit to evaluate present status whether she needs any further treatment.   She would probably need a repeat colonoscopy in 2 years

## 2022-09-07 ENCOUNTER — TELEPHONE (OUTPATIENT)
Dept: GASTROENTEROLOGY | Age: 57
End: 2022-09-07

## 2022-09-07 NOTE — TELEPHONE ENCOUNTER
I called patient, after verified , explained that her colonoscopy showed colitis, she is scheduled for a follow up appt, on Oct 24th. She has been using the med for her constipation, and said it is helping. Will repeat colonoscopy in 2 yrs. She said ok.

## 2022-09-07 NOTE — TELEPHONE ENCOUNTER
----- Message from Ethan Waldron MD sent at 8/28/2022 10:55 PM EDT -----  Tell patient that the biopsies taken and her colon showed colitis/inflammation. Patient will probably need a follow-up visit to evaluate present status whether she needs any further treatment.   She would probably need a repeat colonoscopy in 2 years

## 2022-10-24 ENCOUNTER — OFFICE VISIT (OUTPATIENT)
Dept: GASTROENTEROLOGY | Age: 57
End: 2022-10-24
Payer: COMMERCIAL

## 2022-10-24 VITALS
RESPIRATION RATE: 14 BRPM | TEMPERATURE: 97.6 F | SYSTOLIC BLOOD PRESSURE: 140 MMHG | OXYGEN SATURATION: 99 % | HEART RATE: 80 BPM | HEIGHT: 60 IN | BODY MASS INDEX: 27.84 KG/M2 | DIASTOLIC BLOOD PRESSURE: 88 MMHG | WEIGHT: 141.8 LBS

## 2022-10-24 DIAGNOSIS — K64.9 HEMORRHOIDS, UNSPECIFIED HEMORRHOID TYPE: ICD-10-CM

## 2022-10-24 DIAGNOSIS — R10.30 LOWER ABDOMINAL PAIN: ICD-10-CM

## 2022-10-24 DIAGNOSIS — K62.89 PROCTITIS: ICD-10-CM

## 2022-10-24 DIAGNOSIS — K52.9 COLITIS: ICD-10-CM

## 2022-10-24 DIAGNOSIS — K62.5 GASTROINTESTINAL HEMORRHAGE ASSOCIATED WITH ANORECTAL SOURCE: Primary | ICD-10-CM

## 2022-10-24 DIAGNOSIS — K62.4 ANAL STENOSIS: ICD-10-CM

## 2022-10-24 PROCEDURE — 99214 OFFICE O/P EST MOD 30 MIN: CPT | Performed by: INTERNAL MEDICINE

## 2022-10-24 NOTE — PROGRESS NOTES
1. Have you been to the ER, urgent care clinic since your last visit? Hospitalized since your last visit? no    2. Have you seen or consulted any other health care providers outside of the [address] [place] [address] since your last visit? Include any pap smears or colon screening.  No   Chief Complaint   Patient presents with    Follow-up     colitis     Visit Vitals  BP (!) 140/88 (BP 1 Location: Left upper arm, BP Patient Position: Sitting, BP Cuff Size: Adult)   Pulse 80   Temp 97.6 °F (36.4 °C) (Temporal)   Resp 14   Ht 5' (1.524 m)   Wt 64.3 kg (141 lb 12.8 oz)   SpO2 99% Comment: room air   BMI 27.69 kg/m²

## 2022-10-31 NOTE — PROGRESS NOTES
Katlyn Samuel is a 61 y.o. female who presents today for the following:  Chief Complaint   Patient presents with    Follow-up     Colitis. Says about the same         No Known Allergies    Current Outpatient Medications   Medication Sig    lactulose (CHRONULAC) 10 gram/15 mL solution Take 30 mL by mouth daily (after dinner). Indications: constipation     No current facility-administered medications for this visit. Past Medical History:   Diagnosis Date    Abdominal pain     lower    Anemia 2022    HGB 10.7      DR KEMP'S OFFICE.     Constipation     Decreased appetite 2022    Diarrhea 2022    H/O hemorrhoids     Hemorrhage of rectum and anus 2022    Hx of fever 2022    HX OF FEVER AND CHILLS 22    Nausea 2022    Over weight     Rectal bleeding        Past Surgical History:   Procedure Laterality Date    COLONOSCOPY N/A 2022    COLONOSCOPY (T I V A) performed by Singh Pace MD at [facility] ENDOSCOPY    HX  SECTION      HX OTHER SURGICAL      abortions,     HX OTHER SURGICAL      D & C       Family History   Problem Relation Age of Onset    Diabetes Mother     Hypertension Mother     Heart Disease Father        Social History     Socioeconomic History    Marital status:      Spouse name: Not on file    Number of children: Not on file    Years of education: Not on file    Highest education level: Not on file   Occupational History    Not on file   Tobacco Use    Smoking status: Never    Smokeless tobacco: Never   Vaping Use    Vaping Use: Never used   Substance and Sexual Activity    Alcohol use: Not Currently     Alcohol/week: 1.0 standard drink     Types: 1 Glasses of wine per week     Comment: occaional    Drug use: Never    Sexual activity: Not on file   Other Topics Concern    Not on file   Social History Narrative    Not on file     Social Determinants of Health     Financial Resource Strain: Not on file   Food Insecurity: Not on file Transportation Needs: Not on file   Physical Activity: Not on file   Stress: Not on file   Social Connections: Not on file   Intimate Partner Violence: Not on file   Housing Stability: Not on file         HPI  50-year-old female with history of chronic constipation hemorrhoids who comes in for follow-up visit. Patient had a colonoscopy on 7/14/2022 which showed focal colitis in right colon, proctitis, and anal stenosis. Patient states she is presently doing fine. She states that the lactulose works when she takes it and she uses approximately once a week. He only takes it when she is not going to work. Review of Systems   Constitutional: Negative. HENT: Negative. Negative for nosebleeds. Eyes: Negative. Respiratory: Negative. Cardiovascular: Negative. Gastrointestinal:  Positive for abdominal pain, blood in stool and constipation. Negative for diarrhea, heartburn, melena, nausea and vomiting. Genitourinary: Negative. Musculoskeletal:  Positive for joint pain. Skin: Negative. Neurological: Negative. Endo/Heme/Allergies: Negative. Psychiatric/Behavioral: Negative. All other systems reviewed and are negative. Visit Vitals  BP (!) 140/88 (BP 1 Location: Left upper arm, BP Patient Position: Sitting, BP Cuff Size: Adult)   Pulse 80   Temp 97.6 °F (36.4 °C) (Temporal)   Resp 14   Ht 5' (1.524 m)   Wt 64.3 kg (141 lb 12.8 oz)   SpO2 99% Comment: room air   BMI 27.69 kg/m²     Physical Exam  Vitals and nursing note reviewed. Constitutional:       Appearance: Normal appearance. HENT:      Head: Normocephalic and atraumatic. Nose: Nose normal.      Mouth/Throat:      Mouth: Mucous membranes are moist.      Pharynx: Oropharynx is clear. Eyes:      Conjunctiva/sclera: Conjunctivae normal.      Pupils: Pupils are equal, round, and reactive to light. Cardiovascular:      Rate and Rhythm: Normal rate and regular rhythm. Pulses: Normal pulses.       Heart sounds: Normal heart sounds. Pulmonary:      Effort: Pulmonary effort is normal.      Breath sounds: Normal breath sounds. Abdominal:      General: Bowel sounds are normal.      Palpations: Abdomen is soft. Musculoskeletal:         General: Normal range of motion. Cervical back: Normal range of motion and neck supple. Skin:     General: Skin is warm and dry. Neurological:      General: No focal deficit present. Mental Status: She is alert and oriented to person, place, and time. Psychiatric:         Mood and Affect: Mood normal.         Behavior: Behavior normal.         Thought Content: Thought content normal.         Judgment: Judgment normal.          1. Gastrointestinal hemorrhage associated with anorectal source  No gross GI bleeding noted. Continue the lactulose to start taking it on a daily basis. 2. Lower abdominal pain      3. Hemorrhoids, unspecified hemorrhoid type  Likely because of her previous rectal bleeding which is now resolved. 4. Colitis      5. Anal stenosis  Patient's still needs to be soft otherwise trauma can occur secondary to the stenosis.     6. Proctitis

## 2023-05-21 RX ORDER — LACTULOSE 10 G/15ML
30 SOLUTION ORAL
COMMUNITY
Start: 2022-07-05

## 2024-08-12 ENCOUNTER — OFFICE VISIT (OUTPATIENT)
Age: 59
End: 2024-08-12
Payer: COMMERCIAL

## 2024-08-12 VITALS
WEIGHT: 142.8 LBS | RESPIRATION RATE: 14 BRPM | HEART RATE: 84 BPM | BODY MASS INDEX: 28.03 KG/M2 | DIASTOLIC BLOOD PRESSURE: 80 MMHG | HEIGHT: 60 IN | TEMPERATURE: 97.6 F | SYSTOLIC BLOOD PRESSURE: 140 MMHG

## 2024-08-12 DIAGNOSIS — K59.04 CHRONIC IDIOPATHIC CONSTIPATION: Primary | ICD-10-CM

## 2024-08-12 PROCEDURE — 99214 OFFICE O/P EST MOD 30 MIN: CPT | Performed by: INTERNAL MEDICINE

## 2024-08-12 RX ORDER — LACTULOSE 10 G/15ML
20 SOLUTION ORAL
Qty: 946 ML | Refills: 5 | Status: SHIPPED | OUTPATIENT
Start: 2024-08-12

## 2024-08-12 ASSESSMENT — PATIENT HEALTH QUESTIONNAIRE - PHQ9
1. LITTLE INTEREST OR PLEASURE IN DOING THINGS: NOT AT ALL
SUM OF ALL RESPONSES TO PHQ QUESTIONS 1-9: 0
SUM OF ALL RESPONSES TO PHQ9 QUESTIONS 1 & 2: 0
2. FEELING DOWN, DEPRESSED OR HOPELESS: NOT AT ALL
SUM OF ALL RESPONSES TO PHQ QUESTIONS 1-9: 0

## 2024-08-12 NOTE — PROGRESS NOTES
1. Have you been to the ER, urgent care clinic since your last visit?  Hospitalized since your last visit? no    2. Have you seen or consulted any other health care providers outside of the [facility] since your last visit?  Include any pap smears or colon screening.  No   Chief Complaint   Patient presents with    Follow-up    Constipation     BP (!) 140/80 (Site: Left Upper Arm, Position: Sitting, Cuff Size: Medium Adult)   Pulse 84   Temp 97.6 °F (36.4 °C) (Temporal)   Resp 14   Ht 1.524 m (5')   Wt 64.8 kg (142 lb 12.8 oz)   BMI 27.89 kg/m²

## 2024-08-30 ASSESSMENT — ENCOUNTER SYMPTOMS
BLOOD IN STOOL: 0
ABDOMINAL DISTENTION: 0
ANAL BLEEDING: 0
RECTAL PAIN: 0
ABDOMINAL PAIN: 0
CONSTIPATION: 1
NAUSEA: 0
VOMITING: 0
ALLERGIC/IMMUNOLOGIC NEGATIVE: 1
DIARRHEA: 0
RESPIRATORY NEGATIVE: 1

## 2024-08-31 NOTE — PROGRESS NOTES
Tami Srinivasan is a 58 y.o. female who presents today for the following:  Chief Complaint   Patient presents with    Follow-up    Constipation     Using Lactulose         No Known Allergies    Current Outpatient Medications   Medication Sig Dispense Refill    lactulose (CHRONULAC) 10 GM/15ML solution Take 30 mLs by mouth Daily with supper 946 mL 5     No current facility-administered medications for this visit.       Past Medical History:   Diagnosis Date    Abdominal pain     lower    Anemia 2022    HGB 10.7      DR OLIVEIRA'S OFFICE.    Constipation     Decreased appetite 2022    Diarrhea 2022    H/O hemorrhoids     Hemorrhage of rectum and anus 2022    Hx of fever 2022    HX OF FEVER AND CHILLS 22    Nausea 2022    Over weight     Rectal bleeding        Past Surgical History:   Procedure Laterality Date     SECTION      OTHER SURGICAL HISTORY      D & C    OTHER SURGICAL HISTORY      abortions,        Family History   Problem Relation Age of Onset    Hypertension Mother     Diabetes Mother     Heart Disease Father        Social History     Socioeconomic History    Marital status:      Spouse name: Not on file    Number of children: Not on file    Years of education: Not on file    Highest education level: Not on file   Occupational History    Not on file   Tobacco Use    Smoking status: Never    Smokeless tobacco: Never   Vaping Use    Vaping status: Never Used   Substance and Sexual Activity    Alcohol use: Not Currently     Alcohol/week: 1.0 standard drink of alcohol    Drug use: Never    Sexual activity: Not on file   Other Topics Concern    Not on file   Social History Narrative    Not on file     Social Determinants of Health     Financial Resource Strain: Not on file   Food Insecurity: Not on file   Transportation Needs: Not on file   Physical Activity: Not on file   Stress: Not on file   Social Connections: Not on file   Intimate Partner Violence:

## (undated) DEVICE — 1200CC GUARDIAN II: Brand: GUARDIAN

## (undated) DEVICE — WASH CLOTH INCONT LO LINT PREM 12X13 IN LF DISP

## (undated) DEVICE — JELLY,LUBE,STERILE,FLIP TOP,TUBE,4-OZ: Brand: MEDLINE

## (undated) DEVICE — TUBING, SUCTION, 9/32" X 10', STRAIGHT: Brand: MEDLINE

## (undated) DEVICE — GLOVE ORANGE PI 7 1/2   MSG9075

## (undated) DEVICE — SOLUTION IRRIG 1000ML STRL H2O USP PLAS POUR BTL

## (undated) DEVICE — SPONGE GZ W4XL4IN COT 12 PLY TYP VII WVN C FLD DSGN

## (undated) DEVICE — FCPS RAD JAW 4LC 240CM W/NDL -- BX/20 RADIAL JAW 4

## (undated) DEVICE — PAD,PREPPING,CUFFED,24X48,7",NONSTERILE: Brand: MEDLINE